# Patient Record
Sex: MALE | Race: BLACK OR AFRICAN AMERICAN
[De-identification: names, ages, dates, MRNs, and addresses within clinical notes are randomized per-mention and may not be internally consistent; named-entity substitution may affect disease eponyms.]

---

## 2020-11-10 ENCOUNTER — HOSPITAL ENCOUNTER (EMERGENCY)
Dept: HOSPITAL 63 - ER | Age: 55
Discharge: HOME | End: 2020-11-10
Payer: COMMERCIAL

## 2020-11-10 VITALS — SYSTOLIC BLOOD PRESSURE: 153 MMHG | DIASTOLIC BLOOD PRESSURE: 79 MMHG

## 2020-11-10 VITALS — BODY MASS INDEX: 22.92 KG/M2 | HEIGHT: 75 IN | WEIGHT: 184.31 LBS

## 2020-11-10 DIAGNOSIS — H00.11: Primary | ICD-10-CM

## 2020-11-10 NOTE — PHYS DOC
Past History


Past Medical History:  No Pertinent History





Adult General


Chief Complaint


Chief Complaint:  EYE PROBLEMS





HPI


HPI





Patient is a 54-year-old -American male presenting for right eye problem.

 This problem has been going on for the past 1 to 2 months without any known 

inciting event and/or trauma.  He has no known medical issues, is not on any 

medications.  Reports growth to right upper eyelid.  It is painless.  He has not

done anything in attempt to alleviate the "bump".  He is pending outpatient 

follow-up with PCP but is scheduled to be seen in December.  He is unsure of 

what to do until then and so, he presented today for evaluation.  Denies any 

major headache, changes in vision, fever, drainage, or other concerning 

abnormalities regarding this "bump "





Review of Systems


Review of Systems


Fourteen body systems of review of systems have been reviewed. See HPI for 

pertinent positives and negative responses, other wise all other systems are 

negative, non-pertinent or non-contributory





Allergies


Allergies





Allergies








Coded Allergies Type Severity Reaction Last Updated Verified


 


  No Known Drug Allergies    11/10/20 No











Physical Exam


Physical Exam


Constitutional: Well developed, well nourished, no acute distress, non-toxic 

appearance. 


HENT: Normocephalic, atraumatic, bilateral external ears normal, oropharynx 

moist, no oral exudates, nose normal. 


Eyes: PERRLA, EOMI, conjunctiva normal, no discharge.  Classic appearing 

chalazion of right upper eyelid.  Approximately 1 cm x 5 mm


Neck: Normal range of motion, no tenderness, supple, no stridor.  


Cardiovascular: Heart rate regular, sinus rhythm, no murmurs rubs or gallops


Lungs & Thorax:  Bilateral breath sounds clear to auscultation 


Abdomen: Bowel sounds normal, soft, no tenderness, no masses, no pulsatile 

masses.  Nonsurgical abdomen, no peritoneal signs


Skin: Warm, dry, no erythema, no rash.  


Back: No tenderness, no CVA tenderness.  


Extremities: No tenderness, no cyanosis, no clubbing, ROM intact, no edema.  


Neurologic: Alert and oriented X 3, grossly normal motor & sensory function, no 

focal deficits noted. 


Psychologic: Affect normal, judgement normal, mood normal.





EKG


EKG


[]





Radiology/Procedures


Radiology/Procedures


[]





Heart Score


Risk Factors:


Risk Factors:  DM, Current or recent (<one month) smoker, HTN, HLP, family 

history of CAD, obesity.


Risk Scores:


Risk Factors:  DM, Current or recent (<one month) smoker, HTN, HLP, family 

history of CAD, obesity.





Course & Med Decision Making


Course & Med Decision Making


Pertinent Labs and Imaging studies reviewed. (See chart for details)





Discussed most likely diagnosis of right upper eyelid chalazion.  Advised this 

is likely self-limiting in nature, no role for incision and drainage and/or 

antibiotics at this time


I advised supportive care practices for patient such as warm compresses for 15 

minutes 4 times daily.  I advised patient to call PCP to accelerate his ability 

to be seen in outpatient setting for follow-up.  I disclosed he might require 

ophthalmology referral if unresolved


Given patient is hemodynamically stable without any obvious ocular abnormalities

 given vision is unchanged and cranial nerves are all intact, I feel patient is 

safe for discharge home


Strict return precautions were discussed with good understanding, all questions 

and concerns addressed prior to ER departure in stable condition





Dragon Disclaimer


Dragon Disclaimer


This electronic medical record was generated, in whole or in part, using a voice

 recognition dictation system.





Departure


Departure:


Impression:  


   Primary Impression:  


   Chalazion of right upper eyelid


Disposition:  01 DC HOME SELF CARE/HOMELESS


Condition:  STABLE


Referrals:  


PCP,NO (PCP)


Patient Instructions:  ARGELIA Todd DO                 Nov 10, 2020 17:07